# Patient Record
Sex: MALE | Race: OTHER | HISPANIC OR LATINO | URBAN - METROPOLITAN AREA
[De-identification: names, ages, dates, MRNs, and addresses within clinical notes are randomized per-mention and may not be internally consistent; named-entity substitution may affect disease eponyms.]

---

## 2020-09-08 ENCOUNTER — EMERGENCY (EMERGENCY)
Facility: HOSPITAL | Age: 20
LOS: 1 days | Discharge: HOME | End: 2020-09-08
Attending: STUDENT IN AN ORGANIZED HEALTH CARE EDUCATION/TRAINING PROGRAM | Admitting: STUDENT IN AN ORGANIZED HEALTH CARE EDUCATION/TRAINING PROGRAM
Payer: SELF-PAY

## 2020-09-08 VITALS
SYSTOLIC BLOOD PRESSURE: 155 MMHG | WEIGHT: 149.91 LBS | RESPIRATION RATE: 18 BRPM | DIASTOLIC BLOOD PRESSURE: 71 MMHG | HEART RATE: 51 BPM | OXYGEN SATURATION: 98 % | TEMPERATURE: 98 F

## 2020-09-08 PROCEDURE — 93010 ELECTROCARDIOGRAM REPORT: CPT

## 2020-09-08 PROCEDURE — 99285 EMERGENCY DEPT VISIT HI MDM: CPT

## 2020-09-08 PROCEDURE — 99053 MED SERV 10PM-8AM 24 HR FAC: CPT

## 2020-09-08 NOTE — ED ADULT NURSE NOTE - CHIEF COMPLAINT QUOTE
CP radiating to R side of neck x 1-2 hours with SOB/ went to [facility] on Monday and had unremarkable cardiac work up/ was advised to see cardiologist for echo and carotid u/s   denies medical problems, no daily medications  took ASA 81mg PTA

## 2020-09-08 NOTE — ED ADULT NURSE NOTE - OBJECTIVE STATEMENT
received pt stable alert oriented x4 no distress c/o chest disconfort s/p pulling object and develop disconfort pt [facility] and d/c to follow up with PMd but pt has none at present pt medicated with asa ond blood work drawned and sent to lab and ekg done and reviewed and xray done ivf infusing well

## 2020-09-08 NOTE — ED ADULT TRIAGE NOTE - CHIEF COMPLAINT QUOTE
CP radiating to R side of neck x 1-2 hours with SOB  denies medical problems, no daily medications  took ASA 81mg PTA CP radiating to R side of neck x 1-2 hours with SOB/ went to [facility] on Monday and had unremarkable cardiac work up/ was advised to see cardiologist for echo and carotid u/s   denies medical problems, no daily medications  took ASA 81mg PTA

## 2020-09-09 VITALS
DIASTOLIC BLOOD PRESSURE: 69 MMHG | RESPIRATION RATE: 18 BRPM | OXYGEN SATURATION: 98 % | HEART RATE: 54 BPM | SYSTOLIC BLOOD PRESSURE: 120 MMHG | TEMPERATURE: 98 F

## 2020-09-09 LAB
ALBUMIN SERPL ELPH-MCNC: 4.6 G/DL — SIGNIFICANT CHANGE UP (ref 3.3–5)
ALP SERPL-CCNC: 55 U/L — SIGNIFICANT CHANGE UP (ref 40–120)
ALT FLD-CCNC: 46 U/L — SIGNIFICANT CHANGE UP (ref 12–78)
ANION GAP SERPL CALC-SCNC: 4 MMOL/L — LOW (ref 5–17)
APTT BLD: 37.8 SEC — HIGH (ref 27.5–35.5)
AST SERPL-CCNC: 25 U/L — SIGNIFICANT CHANGE UP (ref 15–37)
BASOPHILS # BLD AUTO: 0.05 K/UL — SIGNIFICANT CHANGE UP (ref 0–0.2)
BASOPHILS NFR BLD AUTO: 1.1 % — SIGNIFICANT CHANGE UP (ref 0–2)
BILIRUB SERPL-MCNC: 0.6 MG/DL — SIGNIFICANT CHANGE UP (ref 0.2–1.2)
BUN SERPL-MCNC: 15 MG/DL — SIGNIFICANT CHANGE UP (ref 7–23)
CALCIUM SERPL-MCNC: 9.5 MG/DL — SIGNIFICANT CHANGE UP (ref 8.5–10.1)
CHLORIDE SERPL-SCNC: 107 MMOL/L — SIGNIFICANT CHANGE UP (ref 96–108)
CK MB BLD-MCNC: 0.8 % — SIGNIFICANT CHANGE UP (ref 0–3.5)
CK MB CFR SERPL CALC: 1.4 NG/ML — SIGNIFICANT CHANGE UP (ref 0–3.6)
CK MB CFR SERPL CALC: 1.5 NG/ML — SIGNIFICANT CHANGE UP (ref 0–3.6)
CK SERPL-CCNC: 171 U/L — SIGNIFICANT CHANGE UP (ref 26–308)
CO2 SERPL-SCNC: 29 MMOL/L — SIGNIFICANT CHANGE UP (ref 22–31)
CREAT SERPL-MCNC: 0.96 MG/DL — SIGNIFICANT CHANGE UP (ref 0.5–1.3)
D DIMER BLD IA.RAPID-MCNC: <150 NG/ML DDU — SIGNIFICANT CHANGE UP
EOSINOPHIL # BLD AUTO: 0.12 K/UL — SIGNIFICANT CHANGE UP (ref 0–0.5)
EOSINOPHIL NFR BLD AUTO: 2.6 % — SIGNIFICANT CHANGE UP (ref 0–6)
ERYTHROCYTE [SEDIMENTATION RATE] IN BLOOD: 1 MM/HR — SIGNIFICANT CHANGE UP (ref 0–15)
GLUCOSE SERPL-MCNC: 72 MG/DL — SIGNIFICANT CHANGE UP (ref 70–99)
HCT VFR BLD CALC: 46.3 % — SIGNIFICANT CHANGE UP (ref 39–50)
HGB BLD-MCNC: 16.4 G/DL — SIGNIFICANT CHANGE UP (ref 13–17)
IMM GRANULOCYTES NFR BLD AUTO: 0.6 % — SIGNIFICANT CHANGE UP (ref 0–1.5)
INR BLD: 1.06 RATIO — SIGNIFICANT CHANGE UP (ref 0.88–1.16)
LIDOCAIN IGE QN: 156 U/L — SIGNIFICANT CHANGE UP (ref 73–393)
LYMPHOCYTES # BLD AUTO: 1.15 K/UL — SIGNIFICANT CHANGE UP (ref 1–3.3)
LYMPHOCYTES # BLD AUTO: 24.5 % — SIGNIFICANT CHANGE UP (ref 13–44)
MAGNESIUM SERPL-MCNC: 2.3 MG/DL — SIGNIFICANT CHANGE UP (ref 1.6–2.6)
MCHC RBC-ENTMCNC: 29.9 PG — SIGNIFICANT CHANGE UP (ref 27–34)
MCHC RBC-ENTMCNC: 35.4 GM/DL — SIGNIFICANT CHANGE UP (ref 32–36)
MCV RBC AUTO: 84.3 FL — SIGNIFICANT CHANGE UP (ref 80–100)
MONOCYTES # BLD AUTO: 0.36 K/UL — SIGNIFICANT CHANGE UP (ref 0–0.9)
MONOCYTES NFR BLD AUTO: 7.7 % — SIGNIFICANT CHANGE UP (ref 2–14)
NEUTROPHILS # BLD AUTO: 2.99 K/UL — SIGNIFICANT CHANGE UP (ref 1.8–7.4)
NEUTROPHILS NFR BLD AUTO: 63.5 % — SIGNIFICANT CHANGE UP (ref 43–77)
NRBC # BLD: 0 /100 WBCS — SIGNIFICANT CHANGE UP (ref 0–0)
NT-PROBNP SERPL-SCNC: <5 PG/ML — SIGNIFICANT CHANGE UP (ref 0–125)
PLATELET # BLD AUTO: 227 K/UL — SIGNIFICANT CHANGE UP (ref 150–400)
POTASSIUM SERPL-MCNC: 4 MMOL/L — SIGNIFICANT CHANGE UP (ref 3.5–5.3)
POTASSIUM SERPL-SCNC: 4 MMOL/L — SIGNIFICANT CHANGE UP (ref 3.5–5.3)
PROT SERPL-MCNC: 8.1 G/DL — SIGNIFICANT CHANGE UP (ref 6–8.3)
PROTHROM AB SERPL-ACNC: 12.4 SEC — SIGNIFICANT CHANGE UP (ref 10.6–13.6)
RBC # BLD: 5.49 M/UL — SIGNIFICANT CHANGE UP (ref 4.2–5.8)
RBC # FLD: 11.9 % — SIGNIFICANT CHANGE UP (ref 10.3–14.5)
SODIUM SERPL-SCNC: 140 MMOL/L — SIGNIFICANT CHANGE UP (ref 135–145)
TROPONIN I SERPL-MCNC: <.015 NG/ML — SIGNIFICANT CHANGE UP (ref 0.01–0.04)
TROPONIN I SERPL-MCNC: <.015 NG/ML — SIGNIFICANT CHANGE UP (ref 0.01–0.04)
WBC # BLD: 4.7 K/UL — SIGNIFICANT CHANGE UP (ref 3.8–10.5)
WBC # FLD AUTO: 4.7 K/UL — SIGNIFICANT CHANGE UP (ref 3.8–10.5)

## 2020-09-09 PROCEDURE — 93880 EXTRACRANIAL BILAT STUDY: CPT | Mod: 26

## 2020-09-09 PROCEDURE — 85610 PROTHROMBIN TIME: CPT

## 2020-09-09 PROCEDURE — 83690 ASSAY OF LIPASE: CPT

## 2020-09-09 PROCEDURE — 84484 ASSAY OF TROPONIN QUANT: CPT

## 2020-09-09 PROCEDURE — 85379 FIBRIN DEGRADATION QUANT: CPT

## 2020-09-09 PROCEDURE — 80053 COMPREHEN METABOLIC PANEL: CPT

## 2020-09-09 PROCEDURE — 96360 HYDRATION IV INFUSION INIT: CPT

## 2020-09-09 PROCEDURE — 83735 ASSAY OF MAGNESIUM: CPT

## 2020-09-09 PROCEDURE — 85652 RBC SED RATE AUTOMATED: CPT

## 2020-09-09 PROCEDURE — 36415 COLL VENOUS BLD VENIPUNCTURE: CPT

## 2020-09-09 PROCEDURE — 71045 X-RAY EXAM CHEST 1 VIEW: CPT

## 2020-09-09 PROCEDURE — 82553 CREATINE MB FRACTION: CPT

## 2020-09-09 PROCEDURE — 71045 X-RAY EXAM CHEST 1 VIEW: CPT | Mod: 26

## 2020-09-09 PROCEDURE — 99284 EMERGENCY DEPT VISIT MOD MDM: CPT | Mod: 25

## 2020-09-09 PROCEDURE — 99284 EMERGENCY DEPT VISIT MOD MDM: CPT

## 2020-09-09 PROCEDURE — 96361 HYDRATE IV INFUSION ADD-ON: CPT

## 2020-09-09 PROCEDURE — 83880 ASSAY OF NATRIURETIC PEPTIDE: CPT

## 2020-09-09 PROCEDURE — 82550 ASSAY OF CK (CPK): CPT

## 2020-09-09 PROCEDURE — 93005 ELECTROCARDIOGRAM TRACING: CPT

## 2020-09-09 PROCEDURE — 93880 EXTRACRANIAL BILAT STUDY: CPT

## 2020-09-09 PROCEDURE — 85025 COMPLETE CBC W/AUTO DIFF WBC: CPT

## 2020-09-09 PROCEDURE — 85730 THROMBOPLASTIN TIME PARTIAL: CPT

## 2020-09-09 RX ORDER — SODIUM CHLORIDE 9 MG/ML
1000 INJECTION INTRAMUSCULAR; INTRAVENOUS; SUBCUTANEOUS ONCE
Refills: 0 | Status: COMPLETED | OUTPATIENT
Start: 2020-09-09 | End: 2020-09-09

## 2020-09-09 RX ORDER — ASPIRIN/CALCIUM CARB/MAGNESIUM 324 MG
325 TABLET ORAL ONCE
Refills: 0 | Status: COMPLETED | OUTPATIENT
Start: 2020-09-09 | End: 2020-09-09

## 2020-09-09 RX ADMIN — SODIUM CHLORIDE 1000 MILLILITER(S): 9 INJECTION INTRAMUSCULAR; INTRAVENOUS; SUBCUTANEOUS at 02:17

## 2020-09-09 RX ADMIN — Medication 325 MILLIGRAM(S): at 00:26

## 2020-09-09 RX ADMIN — SODIUM CHLORIDE 1000 MILLILITER(S): 9 INJECTION INTRAMUSCULAR; INTRAVENOUS; SUBCUTANEOUS at 00:25

## 2020-09-09 NOTE — ED ADULT NURSE REASSESSMENT NOTE - NS ED NURSE REASSESS COMMENT FT1
pt stable and ultrasound done awaiting result pt with no c/o voiced at present awaiting blood work 7 am
pt with troponin #2 negative awaiting  cardiologist
Report received, care assumed at 0700. Patient resting, A&Ox4. No complaints at this time. Cardiac monitor in place. PIV intact. Plan of care discussed with patient. Comfort and safety maintained. Will continue to monitor.
pt reavaluated and vital signs stable repeat tropnin as ordered awaiting pt denies any c/o of pain at present

## 2020-09-09 NOTE — ED PROVIDER NOTE - CARE PROVIDER_API CALL
Rhys Bañuelos)  Cardiovascular Disease  [address] [place] [address]  Phone: [phone]  Fax: [phone]  Follow Up Time: 7-10 Days

## 2020-09-09 NOTE — ED PROVIDER NOTE - PATIENT PORTAL LINK FT
You can access the FollowMyHealth Patient Portal offered by [facility] by registering at the following website: [email or web address]. By joining [facility]’s FollowMyHealth portal, you will also be able to view your health information using other applications (apps) compatible with our system.

## 2020-09-09 NOTE — ED PROVIDER NOTE - SHIFT CHANGE DETAILS
Patient seen by endocrine for T2DM management while inpatient. She was discharged on wrong doses. Called to clarify correct regimen. No answer. Left message for patient to return our call.    Correct regimen:  Lantus 18 units qHS  Humalog 7 units AC +1:50>150  Metformin  mg BID with food   cardiac consult in the morning

## 2020-09-09 NOTE — ED PROVIDER NOTE - OBJECTIVE STATEMENT
20 male presents to ER c/o chest pain and near syncope. Patient states for the last 4 days he has felt a throbbing pulsation to right neck, [facility] [place], had ekg, labs, chest xray, was recommended that patient stay for more testing but refused and went home. 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

## 2020-09-09 NOTE — CONSULT NOTE ADULT - SUBJECTIVE AND OBJECTIVE BOX
**** Charting in progress****    [facility]         Suzy Coto, Sarmad, Malik, Charbel Constantino Savella        [phone] (office)    Reason for Consult: Chest pain      HPI: Patient is a 20 year old male with no known PMH who presents today after 1 episode of chest pain last night. He states he was working last night and felt substernal chest pain while pulling a heavy pallet. He became diaphoretic with blurry vision, confusion, nausea, and reports he felt like he was going to pass out. The patient also states he has been having right jugular vein pulsating for the past four days. These symptoms are new. In the past he has had chest pain after eating spicy/greasy foods but reports that this pain is different than what he experienced last night. He endorses having a rather active lifestyle and denies sitting for long periods of time. He denies any history of anxiety or panic attacks but states he does worry about work frequently. No history of sudden cardiac death in family.  At this time he does not have cp, sob, n/v.      PAST MEDICAL & SURGICAL HISTORY:  No pertinent past medical history  No significant past surgical history      SOCIAL HISTORY: No active tobacco, alcohol or illicit drug use    FAMILY HISTORY: Mom and Dad both with HTN and HLD      Home Medications: none      MEDICATIONS  (STANDING):    MEDICATIONS  (PRN):      Allergies NKDA    No Known Allergies    Intolerances        REVIEW OF SYSTEMS: Negative except as per HPI.    VITAL SIGNS:   Vital Signs Last 24 Hrs  T(C): 36.7 (09 Sep 2020 06:17), Max: 36.7 (08 Sep 2020 23:35)  T(F): 98 (09 Sep 2020 06:17), Max: 98 (08 Sep 2020 23:35)  HR: 62 (09 Sep 2020 07:22) (51 - 62)  BP: 117/64 (09 Sep 2020 07:22) (112/71 - 155/71)  BP(mean): --  RR: 16 (09 Sep 2020 07:22) (16 - 18)  SpO2: 98% (09 Sep 2020 07:22) (98% - 98%)    I&O's Summary      PHYSICAL EXAM:  Constitutional: NAD, well-developed, mildly anxious  HEENT NC/AT, moist mucous membranes  Pulmonary: Non-labored, breath sounds are clear bilaterally, no wheezing, rales or rhonchi  Cardiovascular: +S1, S2, RRR, no murmur  Gastrointestinal: Soft, nontender, nondistended, normoactive bowel sounds  Extremities: No peripheral edema   Neurological: Alert, strength and sensitivity are grossly intact  Skin: No obvious lesions/rashes  Psych: Mood & affect appropriate    LABS: All Labs Reviewed:                        16.4   4.70  )-----------( 227      ( 09 Sep 2020 00:55 )             46.3     09 Sep 2020 00:55    140    |  107    |  15     ----------------------------<  72     4.0     |  29     |  0.96     Ca    9.5        09 Sep 2020 00:55  Mg     2.3       09 Sep 2020 00:55    TPro  8.1    /  Alb  4.6    /  TBili  0.6    /  DBili  x      /  AST  25     /  ALT  46     /  AlkPhos  55     09 Sep 2020 00:55    PT/INR - ( 09 Sep 2020 00:55 )   PT: 12.4 sec;   INR: 1.06 ratio         PTT - ( 09 Sep 2020 00:55 )  PTT:37.8 sec  CARDIAC MARKERS ( 09 Sep 2020 06:32 )  <.015 ng/mL / x     / x     / x     / x      CARDIAC MARKERS ( 09 Sep 2020 00:55 )  <.015 ng/mL / x     / x     / x     / 1.5 ng/mL      Blood Culture:   09-09 @ 00:55  Pro Bnp <5        EKG:    RADIOLOGY:    CXR: **** Charting in progress****    [facility]         Suzy Coto, Sarmad, Malik, Charbel Constantino Savella        [phone] (office)    Reason for Consult: Chest pain      HPI: Patient is a 20 year old male with no known PMH who presents today after 1 episode of chest pain last night. He states he was working last night and felt substernal chest pain while pulling a heavy pallet. He became diaphoretic with blurry vision, confusion, nausea, and reports he felt like he was going to pass out. Chest pain lasted from 10pm to 1am in the morning. The patient also states he has been having right jugular vein pulsating for the past four days. These symptoms are new. The neck pulsating is constant but is more apparent at night before he sleeps. The neck pulsating really concerns the patient to the point where he worries about something happening to him. This prevents him from sleeping. In the past he has had chest pain after eating spicy/greasy foods but reports that this pain is different than what he experienced last night. He endorses having a rather active lifestyle and denies sitting for long periods of time. He denies any history of anxiety or panic attacks but states he does worry about work frequently. No history of sudden cardiac death in family.  At this time he does not have cp, sob, n/v.      PAST MEDICAL & SURGICAL HISTORY:  No pertinent past medical history  No significant past surgical history      SOCIAL HISTORY: No active tobacco, alcohol or illicit drug use    FAMILY HISTORY: Mom and Dad both with HTN and HLD      Home Medications: none      MEDICATIONS  (STANDING):    MEDICATIONS  (PRN):      Allergies NKDA    No Known Allergies    Intolerances        REVIEW OF SYSTEMS: Negative except as per HPI.    VITAL SIGNS:   Vital Signs Last 24 Hrs  T(C): 36.7 (09 Sep 2020 06:17), Max: 36.7 (08 Sep 2020 23:35)  T(F): 98 (09 Sep 2020 06:17), Max: 98 (08 Sep 2020 23:35)  HR: 62 (09 Sep 2020 07:22) (51 - 62)  BP: 117/64 (09 Sep 2020 07:22) (112/71 - 155/71)  BP(mean): --  RR: 16 (09 Sep 2020 07:22) (16 - 18)  SpO2: 98% (09 Sep 2020 07:22) (98% - 98%)    I&O's Summary      PHYSICAL EXAM:  Constitutional: NAD, well-developed, mildly anxious  HEENT NC/AT, moist mucous membranes  Pulmonary: Non-labored, breath sounds are clear bilaterally, no wheezing, rales or rhonchi  Cardiovascular: +S1, S2, RRR, no murmur  Gastrointestinal: Soft, nontender, nondistended, normoactive bowel sounds  Extremities: No peripheral edema   Neurological: Alert, strength and sensitivity are grossly intact  Skin: No obvious lesions/rashes  Psych: Mood & affect appropriate    LABS: All Labs Reviewed:                        16.4   4.70  )-----------( 227      ( 09 Sep 2020 00:55 )             46.3     09 Sep 2020 00:55    140    |  107    |  15     ----------------------------<  72     4.0     |  29     |  0.96     Ca    9.5        09 Sep 2020 00:55  Mg     2.3       09 Sep 2020 00:55    TPro  8.1    /  Alb  4.6    /  TBili  0.6    /  DBili  x      /  AST  25     /  ALT  46     /  AlkPhos  55     09 Sep 2020 00:55    PT/INR - ( 09 Sep 2020 00:55 )   PT: 12.4 sec;   INR: 1.06 ratio         PTT - ( 09 Sep 2020 00:55 )  PTT:37.8 sec  CARDIAC MARKERS ( 09 Sep 2020 06:32 )  <.015 ng/mL / x     / x     / x     / x      CARDIAC MARKERS ( 09 Sep 2020 00:55 )  <.015 ng/mL / x     / x     / x     / 1.5 ng/mL      Blood Culture:   09-09 @ 00:55  Pro Bnp <5        EKG:    RADIOLOGY:    CXR: [facility]         Suzy Coto, Sarmad, Malik, Vira, Nivia Barger        [phone] (office)    Reason for Consult: Chest pain      HPI: Patient is a 20 year old male with no known PMH who presents today after 1 episode of chest pain last night. He states he was working last night and felt substernal chest pain while pulling a heavy pallet. He became diaphoretic with blurry vision, confusion, nausea, and reports he felt like he was going to pass out. Chest pain lasted from 10pm to 1am in the morning. The patient also states he has been having right jugular vein pulsating for the past four days. These symptoms are new. The neck pulsating is constant but is more apparent at night before he sleeps. The neck pulsating really concerns the patient to the point where he worries about something happening to him. This prevents him from sleeping. In the past he has had chest pain after eating spicy/greasy foods but reports that this pain is different than what he experienced last night. He endorses having a rather active lifestyle and denies sitting for long periods of time. He denies any history of anxiety or panic attacks but states he does worry about work frequently. No history of sudden cardiac death in family.  At this time he does not have cp, sob, n/v.      PAST MEDICAL & SURGICAL HISTORY:  No pertinent past medical history  No significant past surgical history      SOCIAL HISTORY: No active tobacco, alcohol or illicit drug use    FAMILY HISTORY: Mom and Dad both with HTN and HLD      Home Medications: none      MEDICATIONS  (STANDING):    MEDICATIONS  (PRN):      Allergies NKDA    No Known Allergies    Intolerances        REVIEW OF SYSTEMS: Negative except as per HPI.    VITAL SIGNS:   Vital Signs Last 24 Hrs  T(C): 36.7 (09 Sep 2020 06:17), Max: 36.7 (08 Sep 2020 23:35)  T(F): 98 (09 Sep 2020 06:17), Max: 98 (08 Sep 2020 23:35)  HR: 62 (09 Sep 2020 07:22) (51 - 62)  BP: 117/64 (09 Sep 2020 07:22) (112/71 - 155/71)  BP(mean): --  RR: 16 (09 Sep 2020 07:22) (16 - 18)  SpO2: 98% (09 Sep 2020 07:22) (98% - 98%)    I&O's Summary      PHYSICAL EXAM:  Constitutional: NAD, well-developed, mildly anxious  HEENT NC/AT, moist mucous membranes  Pulmonary: Non-labored, breath sounds are clear bilaterally, no wheezing, rales or rhonchi  Cardiovascular: +S1, S2, RRR, no murmur  Gastrointestinal: Soft, nontender, nondistended, normoactive bowel sounds  Extremities: No peripheral edema   Neurological: Alert, strength and sensitivity are grossly intact  Skin: No obvious lesions/rashes  Psych: Mood & affect appropriate    LABS: All Labs Reviewed:                        16.4   4.70  )-----------( 227      ( 09 Sep 2020 00:55 )             46.3     09 Sep 2020 00:55    140    |  107    |  15     ----------------------------<  72     4.0     |  29     |  0.96     Ca    9.5        09 Sep 2020 00:55  Mg     2.3       09 Sep 2020 00:55    TPro  8.1    /  Alb  4.6    /  TBili  0.6    /  DBili  x      /  AST  25     /  ALT  46     /  AlkPhos  55     09 Sep 2020 00:55    PT/INR - ( 09 Sep 2020 00:55 )   PT: 12.4 sec;   INR: 1.06 ratio         PTT - ( 09 Sep 2020 00:55 )  PTT:37.8 sec  CARDIAC MARKERS ( 09 Sep 2020 06:32 )  <.015 ng/mL / x     / x     / x     / x      CARDIAC MARKERS ( 09 Sep 2020 00:55 )  <.015 ng/mL / x     / x     / x     / 1.5 ng/mL      Blood Culture:   09-09 @ 00:55  Pro Bnp <5        EKG:    RADIOLOGY:    CXR:

## 2020-09-09 NOTE — CONSULT NOTE ADULT - ASSESSMENT
INCOMPLETE    Patient is a 20 year old male with no known PMH who presents today after 1 episode of chest pain last night.  Cardio consulted for r/o ACS. Patient is young with no risk factors for ischemic event. He is hemodynamically stable. His EKG shows NSR with bradycardia at 55bpm. Carotid dopplers are negative for stenosis. D-dimer negative. His cardiac enzymes are negative x2. Physical exam is without evidence of volume overload. His symptoms are likely due to underlying anxiety rather than cardiac etiology.      Recommendations Patient is a 20 year old male with no known PMH who presents today after 1 episode of chest pain last night.  Cardio consulted for r/o ACS. Patient is young with no risk factors for ischemic event. He is hemodynamically stable. His EKG shows NSR with bradycardia at 55bpm. Carotid dopplers are negative for stenosis. D-dimer negative. His cardiac enzymes are negative x2. Physical exam is without evidence of volume overload. His symptoms are likely due to underlying anxiety rather than cardiac etiology.      Recommendations   -Pt to follow up w/  outpt  -Ok for d/c home from cardiac standpoint

## 2020-09-09 NOTE — ED PROVIDER NOTE - PROGRESS NOTE DETAILS
patient chest pain has subsided, awaiting labs, patient states he will stay overnight for morning consult for cardiology, paged Dr. Coto group for consult KV: seen by Jose Manuel Iyer for discharge with outpatient follow up.

## 2020-09-14 PROBLEM — Z00.00 ENCOUNTER FOR PREVENTIVE HEALTH EXAMINATION: Status: ACTIVE | Noted: 2020-09-14

## 2020-09-16 ENCOUNTER — APPOINTMENT (OUTPATIENT)
Dept: CARDIOLOGY | Facility: CLINIC | Age: 20
End: 2020-09-16

## 2022-05-08 ENCOUNTER — OUTPATIENT (OUTPATIENT)
Dept: OUTPATIENT SERVICES | Facility: HOSPITAL | Age: 22
LOS: 1 days | End: 2022-05-08

## 2022-05-08 ENCOUNTER — TRANSCRIPTION ENCOUNTER (OUTPATIENT)
Age: 22
End: 2022-05-08

## 2022-05-08 DIAGNOSIS — Z20.822 CONTACT WITH AND (SUSPECTED) EXPOSURE TO COVID-19: ICD-10-CM

## 2022-05-08 PROBLEM — Z78.9 OTHER SPECIFIED HEALTH STATUS: Chronic | Status: ACTIVE | Noted: 2020-09-08

## 2022-05-08 LAB — SARS-COV-2 RNA SPEC QL NAA+PROBE: SIGNIFICANT CHANGE UP
